# Patient Record
(demographics unavailable — no encounter records)

---

## 2025-03-20 NOTE — PHYSICAL EXAM
[Alert] : alert [Well Nourished] : well nourished [No Acute Distress] : no acute distress [Well Developed] : well developed [Normal Sclera/Conjunctiva] : normal sclera/conjunctiva [EOMI] : extra ocular movement intact [No Proptosis] : no proptosis [Normal Oropharynx] : the oropharynx was normal [Supple] : the neck was supple [Thyroid Not Enlarged] : the thyroid was not enlarged [No Respiratory Distress] : no respiratory distress [No Accessory Muscle Use] : no accessory muscle use [Clear to Auscultation] : lungs were clear to auscultation bilaterally [Normal S1, S2] : normal S1 and S2 [Normal Rate] : heart rate was normal [Regular Rhythm] : with a regular rhythm [No Edema] : no peripheral edema [Pedal Pulses Normal] : the pedal pulses are present [Normal Bowel Sounds] : normal bowel sounds [Not Tender] : non-tender [Not Distended] : not distended [Soft] : abdomen soft [Normal Anterior Cervical Nodes] : no anterior cervical lymphadenopathy [Normal Posterior Cervical Nodes] : no posterior cervical lymphadenopathy [No Spinal Tenderness] : no spinal tenderness [Spine Straight] : spine straight [No Stigmata of Cushings Syndrome] : no stigmata of Cushings Syndrome [Normal Gait] : normal gait [Normal Strength/Tone] : muscle strength and tone were normal [Normal Reflexes] : deep tendon reflexes were 2+ and symmetric [No Tremors] : no tremors [Oriented x3] : oriented to person, place, and time [Abdominal Striae] : no abdominal striae [Acanthosis Nigricans] : no acanthosis nigricans [Acne] : acne present [de-identified] : FG score around 6 (arms/ back)

## 2025-03-20 NOTE — HISTORY OF PRESENT ILLNESS
[FreeTextEntry1] :  21 year female here for assessment of irregular menses, acne, fatigue     Menarche: 11 yrs Intermenstrual interval: 14-60 days Menstrual Periods/ year:  variable       Hormonal Contraceptives:  No Desires pregnancy soon: no Abnormal body hair growth: No Hair loss (scalp):  No Acne: Yes Weight changes (subjective): gain Galactorrhea: No  Diabetes Mellitus: No Pre-Diabetes: No Prior Gestational DM: N/A

## 2025-05-15 NOTE — HISTORY OF PRESENT ILLNESS
[FreeTextEntry1] : reinaldo acne [de-identified] : MUMTAZ ALVARENGA is a 21 year F presenting today for evaluation of the following:  #acne initially did well s/p 3 mo doxy then now on topicals: tretinoin 0.05%, clindamycin, BPO 10% wash persistent comedones on forehead really bother her and she flares with inflammatory papules on cheeks  she used to be controlled with topicals does not flare with periods

## 2025-05-15 NOTE — ASSESSMENT
[FreeTextEntry1] : # Acne vulgaris, MOD, chronic, flaring through topical tx - inflammatory with scarring - Discussed nature and course along with goals/expectations of therapy - CONT OTC BPO wash 10% qAM. SED including dryness, staining of towels - CONT clindamycin 1% solution qAM after BPO wash - INCREASE tretinoin 0.1% cream qHS. May start every three nights and increase to nightly as tolerated. SED including dryness, irritation, sun sensitivity. Use moisturizer. Cannot be used in pregnancy - start doxycycline 100 mg BID for 3 months, instructions discussed, SED, pt understands  RTC as needed

## 2025-05-15 NOTE — HISTORY OF PRESENT ILLNESS
[FreeTextEntry1] : reinaldo acne [de-identified] : MUMTAZ ALVARENGA is a 21 year F presenting today for evaluation of the following:  #acne initially did well s/p 3 mo doxy then now on topicals: tretinoin 0.05%, clindamycin, BPO 10% wash persistent comedones on forehead really bother her and she flares with inflammatory papules on cheeks  she used to be controlled with topicals does not flare with periods

## 2025-05-15 NOTE — PHYSICAL EXAM
[Alert] : alert [Oriented x 3] : ~L oriented x 3 [Declined] : declined [FreeTextEntry3] : comedones on forehead and cheeks with some pitted scarring and erythematous macules inflammatory papules x 4 on cheeks and chest